# Patient Record
Sex: MALE | Race: WHITE | NOT HISPANIC OR LATINO | URBAN - METROPOLITAN AREA
[De-identification: names, ages, dates, MRNs, and addresses within clinical notes are randomized per-mention and may not be internally consistent; named-entity substitution may affect disease eponyms.]

---

## 2022-02-24 ENCOUNTER — OFFICE (OUTPATIENT)
Dept: URBAN - METROPOLITAN AREA CLINIC 67 | Facility: CLINIC | Age: 42
End: 2022-02-24

## 2022-02-24 VITALS
WEIGHT: 170 LBS | TEMPERATURE: 97.1 F | HEART RATE: 84 BPM | DIASTOLIC BLOOD PRESSURE: 84 MMHG | SYSTOLIC BLOOD PRESSURE: 130 MMHG | HEIGHT: 72 IN

## 2022-02-24 DIAGNOSIS — K52.9 NONINFECTIVE GASTROENTERITIS AND COLITIS, UNSPECIFIED: ICD-10-CM

## 2022-02-24 DIAGNOSIS — R63.4 ABNORMAL WEIGHT LOSS: ICD-10-CM

## 2022-02-24 PROCEDURE — 99203 OFFICE O/P NEW LOW 30 MIN: CPT

## 2022-02-24 RX ORDER — SODIUM SULFATE, MAGNESIUM SULFATE, AND POTASSIUM CHLORIDE 17.75; 2.7; 2.25 G/1; G/1; G/1
TABLET ORAL
Qty: 1 | Refills: 0 | Status: COMPLETED
Start: 2022-02-24 | End: 2024-04-26

## 2024-04-26 ENCOUNTER — OFFICE (OUTPATIENT)
Dept: URBAN - METROPOLITAN AREA CLINIC 67 | Facility: CLINIC | Age: 44
End: 2024-04-26

## 2024-04-26 VITALS
DIASTOLIC BLOOD PRESSURE: 70 MMHG | HEART RATE: 98 BPM | OXYGEN SATURATION: 99 % | WEIGHT: 167.7 LBS | SYSTOLIC BLOOD PRESSURE: 110 MMHG | HEIGHT: 72 IN

## 2024-04-26 VITALS — WEIGHT: 167.7 LBS | HEIGHT: 72 IN

## 2024-04-26 DIAGNOSIS — R79.89 OTHER SPECIFIED ABNORMAL FINDINGS OF BLOOD CHEMISTRY: ICD-10-CM

## 2024-04-26 DIAGNOSIS — R11.0 NAUSEA: ICD-10-CM

## 2024-04-26 DIAGNOSIS — K52.9 NONINFECTIVE GASTROENTERITIS AND COLITIS, UNSPECIFIED: ICD-10-CM

## 2024-04-26 PROCEDURE — 99214 OFFICE O/P EST MOD 30 MIN: CPT | Performed by: INTERNAL MEDICINE

## 2024-04-26 PROCEDURE — 76981 USE PARENCHYMA: CPT | Performed by: INTERNAL MEDICINE

## 2024-04-26 RX ORDER — COLESTIPOL HYDROCHLORIDE 1 G/1
TABLET, FILM COATED ORAL
Qty: 180 | Refills: 11 | Status: ACTIVE
Start: 2024-04-26

## 2024-04-26 NOTE — SERVICEHPINOTES
Jeff Andrew   is seen today for a follow-up visit.     
br   43-year-old male establishing care for multiple abdominal complaints.brHe is referred for elevated transaminases. Reviewed primary care note with labs notable for   alk phos 128, platelets 109. January 2024 LFTs normal. Viral hepatitis serologies negative. Ultrasound abdomen notable for normal liver done in April 2024.brPatient has a history of narcolepsy and for this he takes sodium oxybate, he has had chronic nausea and diarrhea over the past year. He is uncertain if this is related to medication side effects.brTried a calcium oxybate compound last year which she did not tolerate well, so had just recently switch back to the brand Xyrem.brHas been having 3-5 loose bowel movements daily without bleeding.Kayleen has a history of a cholecystectomy, denies any alcohol use, no over-the-counter supplements or NSAIDs, no antibiotics in the past 6 months.
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 Awake and conversant.- Eyes: Normal conjunctiva. No icterus.- ENT: Hearing grossly intact. No nasal discharge.- Neck: Neck is supple. No asymmetry or goiter.br- CV: No lower extremity edema. - Respiratory: Respirations are non-labored. No audible wheezing.br - Abdomen: No abdominal guarding, No abdominal distention.- Skin: No jaundice. No rashes or ulcers.- Psych: Cooperative, Appropriate mood and affect.- MSK: Normal ambulation. No upper extremity clubbing or cyanosis.- Neuro: CN II-XII grossly normal.
winston Vanegas reviewed notes uploaded from patient's primary care provider. There was no additional information pertinent to the patient's complaint that was not already obtained from patient interview. I also reviewed additional separate documentation to provide results of any serum or laboratory diagnostics, imaging, or procedures.

## 2024-04-26 NOTE — SERVICENOTES
First try supplementing fiber as instructed above. If this does not improve your symptoms after 2-3 weeks, then stop and start using the colestipol prescription. If fiber causes side effects or makes your diarrhea worse, stop using. You may adjust the colestipol dose up or down depending on your symptom response; do not take more than four tablets twice a day.